# Patient Record
Sex: MALE | Race: WHITE | NOT HISPANIC OR LATINO
[De-identification: names, ages, dates, MRNs, and addresses within clinical notes are randomized per-mention and may not be internally consistent; named-entity substitution may affect disease eponyms.]

---

## 2019-06-24 ENCOUNTER — APPOINTMENT (OUTPATIENT)
Dept: ORTHOPEDIC SURGERY | Facility: CLINIC | Age: 54
End: 2019-06-24
Payer: COMMERCIAL

## 2019-06-24 VITALS
HEIGHT: 76 IN | SYSTOLIC BLOOD PRESSURE: 141 MMHG | BODY MASS INDEX: 26.79 KG/M2 | DIASTOLIC BLOOD PRESSURE: 88 MMHG | WEIGHT: 220 LBS | HEART RATE: 79 BPM

## 2019-06-24 DIAGNOSIS — M51.26 OTHER INTERVERTEBRAL DISC DISPLACEMENT, LUMBAR REGION: ICD-10-CM

## 2019-06-24 PROCEDURE — 72100 X-RAY EXAM L-S SPINE 2/3 VWS: CPT

## 2019-06-24 PROCEDURE — 99204 OFFICE O/P NEW MOD 45 MIN: CPT

## 2019-06-24 RX ORDER — TIZANIDINE 4 MG/1
4 TABLET ORAL
Qty: 60 | Refills: 1 | Status: ACTIVE | COMMUNITY
Start: 2019-06-24 | End: 1900-01-01

## 2019-06-24 NOTE — HISTORY OF PRESENT ILLNESS
[de-identified] : 53 yo M with LBP for 1 month\par in bed, rolled felt pull in right side lower back\par has had ?back issues" with left side lower back in the past, but right is new\par feels cramping and tightness in lower back\par intermittent, worse with bending down\par aleve with some relief

## 2019-06-24 NOTE — ADDENDUM
[FreeTextEntry1] : I, Zheng Aviles, acted solely as a scribe for Dr. Hernandez on 06/24/2019  .\par  \par All medical record entries made by the scribe were at my, Dr. Hernandez, direction and personally dictated by me on 06/24/2019. I have reviewed the chart and agree that the record accurately reflects my personal performance of the history, physical exam, assessment and plan. I have also personally directed, reviewed, and agreed with the chart.\par

## 2019-06-24 NOTE — DISCUSSION/SUMMARY
[de-identified] : L5-S1 degenerative disc disease.\par Discussed options\par PT and HEP.\par NSAIDs\par if no better 3 weeks-MRI lumbar\par Tizanidine\par All questions were answered, all alternatives discussed and the patient is in complete agreement with that plan. Follow-up appointment as instructed. Any issues and the patient will call or come in sooner. \par

## 2019-06-24 NOTE — PHYSICAL EXAM
[Normal] : Gait: normal [SLR] : negative straight leg raise [de-identified] : 5 out of 5 motor strength, sensation is intact and symmetrical full range of motion flexion extension and rotation, no palpatory tenderness full range of motion of hips knees shoulders and elbows (all four extremities), no atrophy, negative straight leg raise, no pathological reflexes, no swelling, normal ambulation, no apparent distress skin is intact, no palpable lymph nodes, no upper or lower extremity instability, alert and oriented x3 and normal mood. Normal finger-to nose test. pain over right SI joint [de-identified] : AP/lat lumbar-L5-S1 decreased disc height-reviewed with patient.\par

## 2019-08-02 ENCOUNTER — APPOINTMENT (OUTPATIENT)
Dept: ORTHOPEDIC SURGERY | Facility: CLINIC | Age: 54
End: 2019-08-02

## 2019-08-02 ENCOUNTER — EMERGENCY (EMERGENCY)
Facility: HOSPITAL | Age: 54
LOS: 1 days | Discharge: HOME | End: 2019-08-02
Attending: EMERGENCY MEDICINE | Admitting: EMERGENCY MEDICINE
Payer: COMMERCIAL

## 2019-08-02 VITALS
SYSTOLIC BLOOD PRESSURE: 143 MMHG | HEART RATE: 62 BPM | DIASTOLIC BLOOD PRESSURE: 95 MMHG | RESPIRATION RATE: 16 BRPM | OXYGEN SATURATION: 97 % | WEIGHT: 214.95 LBS | HEIGHT: 76 IN | TEMPERATURE: 98 F

## 2019-08-02 VITALS
TEMPERATURE: 98 F | OXYGEN SATURATION: 96 % | RESPIRATION RATE: 18 BRPM | SYSTOLIC BLOOD PRESSURE: 123 MMHG | DIASTOLIC BLOOD PRESSURE: 83 MMHG | HEART RATE: 56 BPM

## 2019-08-02 PROCEDURE — 73140 X-RAY EXAM OF FINGER(S): CPT

## 2019-08-02 PROCEDURE — 99283 EMERGENCY DEPT VISIT LOW MDM: CPT

## 2019-08-02 PROCEDURE — 73140 X-RAY EXAM OF FINGER(S): CPT | Mod: 26,RT

## 2019-08-02 PROCEDURE — 99283 EMERGENCY DEPT VISIT LOW MDM: CPT | Mod: 25

## 2019-08-02 RX ORDER — OXYCODONE AND ACETAMINOPHEN 5; 325 MG/1; MG/1
1 TABLET ORAL ONCE
Refills: 0 | Status: DISCONTINUED | OUTPATIENT
Start: 2019-08-02 | End: 2019-08-02

## 2019-08-02 RX ORDER — CEPHALEXIN 500 MG
500 CAPSULE ORAL ONCE
Refills: 0 | Status: COMPLETED | OUTPATIENT
Start: 2019-08-02 | End: 2019-08-02

## 2019-08-02 RX ADMIN — Medication 500 MILLIGRAM(S): at 02:48

## 2019-08-02 RX ADMIN — OXYCODONE AND ACETAMINOPHEN 1 TABLET(S): 5; 325 TABLET ORAL at 02:49

## 2019-08-02 RX ADMIN — OXYCODONE AND ACETAMINOPHEN 1 TABLET(S): 5; 325 TABLET ORAL at 02:09

## 2019-08-02 NOTE — ED PROVIDER NOTE - SKIN, MLM
right 4th finger, nail lifted from nail bed, bleeding actively underneath right 4th finger, nail lifted from nail bed, bleeding actively underneath, +ecchymosis/swelling distal phalynx

## 2019-08-02 NOTE — ED PROVIDER NOTE - CARE PLAN
Principal Discharge DX:	Open displaced fracture of distal phalanx of right ring finger, initial encounter

## 2019-08-02 NOTE — ED ADULT NURSE NOTE - CAS EDN DISCHARGE ASSESSMENT
Dressing clean and dry/No adverse reaction to first time med in ED/Alert and oriented to person, place and time

## 2019-08-02 NOTE — ED PROVIDER NOTE - PROGRESS NOTE DETAILS
provided splint and dressing materials, advise f/u here 2d for wound check, given # for [facility] for f/u will call in am spoke with Dr. Degroot about the case, will have the office call the patient to schedule follow up

## 2019-08-02 NOTE — ED PROVIDER NOTE - OBJECTIVE STATEMENT
54 y.o. M c/o pain right 54 y.o. M c/o pain right 4th digit - was hit straight on by softball 54 y.o. M c/o pain right 4th digit - was hit straight on by softball about 1.5 hrs ago, nail lifted, has been rebandaging, keeps bleeding, not too painful, no paresthesia, no other injury

## 2019-08-02 NOTE — ED PROCEDURE NOTE - PROCEDURE ADDITIONAL DETAILS
lido with epi sprayed onto nailbed to help with bleeding control, xeroform dressing applied to nailbed and fingertip wrapped, bulky bandage applied over mid to distal digit, effectively splinting

## 2023-11-16 ENCOUNTER — TRANSCRIPTION ENCOUNTER (OUTPATIENT)
Age: 58
End: 2023-11-16

## 2024-01-19 PROBLEM — Z78.9 OTHER SPECIFIED HEALTH STATUS: Chronic | Status: ACTIVE | Noted: 2019-08-02

## 2024-02-08 ENCOUNTER — NON-APPOINTMENT (OUTPATIENT)
Age: 59
End: 2024-02-08

## 2024-02-09 ENCOUNTER — APPOINTMENT (OUTPATIENT)
Dept: PULMONOLOGY | Facility: CLINIC | Age: 59
End: 2024-02-09
Payer: COMMERCIAL

## 2024-02-09 VITALS
OXYGEN SATURATION: 97 % | WEIGHT: 230 LBS | HEART RATE: 61 BPM | HEIGHT: 76 IN | SYSTOLIC BLOOD PRESSURE: 135 MMHG | DIASTOLIC BLOOD PRESSURE: 71 MMHG | BODY MASS INDEX: 28.01 KG/M2

## 2024-02-09 DIAGNOSIS — Z85.47 PERSONAL HISTORY OF MALIGNANT NEOPLASM OF TESTIS: ICD-10-CM

## 2024-02-09 DIAGNOSIS — Z80.42 FAMILY HISTORY OF MALIGNANT NEOPLASM OF PROSTATE: ICD-10-CM

## 2024-02-09 DIAGNOSIS — Z80.52 FAMILY HISTORY OF MALIGNANT NEOPLASM OF BLADDER: ICD-10-CM

## 2024-02-09 DIAGNOSIS — Z00.00 ENCOUNTER FOR GENERAL ADULT MEDICAL EXAMINATION W/OUT ABNORMAL FINDINGS: ICD-10-CM

## 2024-02-09 DIAGNOSIS — K21.9 GASTRO-ESOPHAGEAL REFLUX DISEASE W/OUT ESOPHAGITIS: ICD-10-CM

## 2024-02-09 DIAGNOSIS — T75.89XA OTHER SPECIFIED EFFECTS OF EXTERNAL CAUSES, INITIAL ENCOUNTER: ICD-10-CM

## 2024-02-09 DIAGNOSIS — G47.00 INSOMNIA, UNSPECIFIED: ICD-10-CM

## 2024-02-09 DIAGNOSIS — Z01.812 ENCOUNTER FOR PREPROCEDURAL LABORATORY EXAMINATION: ICD-10-CM

## 2024-02-09 DIAGNOSIS — F41.9 ANXIETY DISORDER, UNSPECIFIED: ICD-10-CM

## 2024-02-09 LAB
BILIRUB UR QL STRIP: NORMAL
CLARITY UR: CLEAR
COLLECTION METHOD: NORMAL
GLUCOSE UR-MCNC: NORMAL
HCG UR QL: 0.2 EU/DL
HGB UR QL STRIP.AUTO: NORMAL
KETONES UR-MCNC: NORMAL
LEUKOCYTE ESTERASE UR QL STRIP: NORMAL
NITRITE UR QL STRIP: NORMAL
PH UR STRIP: 6
POCT - HEMOGLOBIN (HGB), QUANTITATIVE, TRANSCUTANEOUS: 15.4
PROT UR STRIP-MCNC: NORMAL
SP GR UR STRIP: 1.01

## 2024-02-09 PROCEDURE — ZZZZZ: CPT

## 2024-02-09 PROCEDURE — 81003 URINALYSIS AUTO W/O SCOPE: CPT | Mod: QW

## 2024-02-09 PROCEDURE — 94729 DIFFUSING CAPACITY: CPT

## 2024-02-09 PROCEDURE — 94060 EVALUATION OF WHEEZING: CPT

## 2024-02-09 PROCEDURE — 94727 GAS DIL/WSHOT DETER LNG VOL: CPT

## 2024-02-09 PROCEDURE — 36415 COLL VENOUS BLD VENIPUNCTURE: CPT

## 2024-02-09 PROCEDURE — 93000 ELECTROCARDIOGRAM COMPLETE: CPT

## 2024-02-09 PROCEDURE — 71046 X-RAY EXAM CHEST 2 VIEWS: CPT

## 2024-02-09 PROCEDURE — 99205 OFFICE O/P NEW HI 60 MIN: CPT | Mod: 25

## 2024-02-09 PROCEDURE — 88738 HGB QUANT TRANSCUTANEOUS: CPT

## 2024-02-09 RX ORDER — OMEPRAZOLE 40 MG/1
40 CAPSULE, DELAYED RELEASE ORAL
Qty: 30 | Refills: 3 | Status: ACTIVE | COMMUNITY
Start: 2024-02-09

## 2024-02-09 RX ORDER — ZOLPIDEM TARTRATE 10 MG/1
10 TABLET, FILM COATED ORAL
Refills: 0 | Status: ACTIVE | COMMUNITY
Start: 2024-02-09

## 2024-02-09 RX ORDER — ESCITALOPRAM OXALATE 10 MG/1
10 TABLET, FILM COATED ORAL
Refills: 0 | Status: ACTIVE | COMMUNITY
Start: 2024-02-09

## 2024-02-09 NOTE — REASON FOR VISIT
[Initial] : an initial visit [TextBox_44] : [employer] certification, exposure, survivorship program

## 2024-02-09 NOTE — REVIEW OF SYSTEMS
[Fatigue] : fatigue [Seasonal Allergies] : seasonal allergies [GERD] : gerd [Anxiety] : anxiety [Negative] : Neurologic [Diabetes] : no diabetes [Thyroid Problem] : no thyroid problem [TextBox_91] : Lumbar disc disease [TextBox_148] : hx testicular  cancer 2013

## 2024-02-09 NOTE — HISTORY OF PRESENT ILLNESS
[Never] : never [TextBox_4] : [employer] 911 certification [employer] exposure Certification diagnosis testicular cancer 2013 GERD Anxiety  Medications  Prilosec Ambien Lexapro  Family history reported Mother 81 Alzheimer's Father 81 prostate and bladder cancer  Systems reviewed Low respiratory symptoms did not report cough wheeze shortness of breath chest tightness No reported history of asthma COPD bronchitis pneumonia Upper respiratory symptoms No reported sinus disease Cardiovascular reported negative for chest burning chest pressure chest pain  Patient Gastrointestinal positive GERD symptoms Reported a positive history of foot disorder Social history Non-smoker Social alcohol

## 2024-02-09 NOTE — PROCEDURE
[FreeTextEntry1] : Blood draw  Chest x-ray PA lateral February 9, 2024 Cardiac size is normal Lung fields are clear Infiltrate pleural effusion or dominant pulmonary nodules not identified Soft tissue bony structures unremarkable  EKG 2/9/24 NSR NSST changes old  ant wall changhes reports  neg stress test  PFT 2/9/24 [place] nl flow  rates  Lung Volumes nl  No  airtrapping  DLCO 106 % HGB 15.4

## 2024-02-09 NOTE — DISCUSSION/SUMMARY
[FreeTextEntry1] : [employer] 911 certification [email or web address] exposure Certification diagnosis testicular cancer 2013 currently reports no active disease with routine monitoring GERD Anxiety  Recommendation per Kiddie Kist Blood work is pending CBC comprehensive metabolic profile lipid profile Recommended to maintain up-to-date colorectal screen Maintain up-to-date prostate cancer screening Maintain up-to-date vaccine protocol including but not limited to flu COVID pneumonia RSV with primary care physician There was some abnormal findings on the EKG patient states that recent stress test that was normal We will provide him with EKG to present to his primary care physician and cardiologist for complete review Patient is in agreement with plan

## 2024-02-10 ENCOUNTER — NON-APPOINTMENT (OUTPATIENT)
Age: 59
End: 2024-02-10

## 2024-02-10 LAB
ALBUMIN SERPL ELPH-MCNC: 5 G/DL
ALP BLD-CCNC: 39 U/L
ALT SERPL-CCNC: 20 U/L
ANION GAP SERPL CALC-SCNC: 12 MMOL/L
AST SERPL-CCNC: 19 U/L
BASOPHILS # BLD AUTO: 0.03 K/UL
BASOPHILS NFR BLD AUTO: 0.5 %
BILIRUB SERPL-MCNC: 0.5 MG/DL
BUN SERPL-MCNC: 16 MG/DL
CALCIUM SERPL-MCNC: 9.8 MG/DL
CHLORIDE SERPL-SCNC: 101 MMOL/L
CHOLEST SERPL-MCNC: 198 MG/DL
CO2 SERPL-SCNC: 25 MMOL/L
CREAT SERPL-MCNC: 1.12 MG/DL
EGFR: 77 ML/MIN/1.73M2
EOSINOPHIL # BLD AUTO: 0.07 K/UL
EOSINOPHIL NFR BLD AUTO: 1.2 %
GLUCOSE SERPL-MCNC: 94 MG/DL
HCT VFR BLD CALC: 47.7 %
HDLC SERPL-MCNC: 37 MG/DL
HGB BLD-MCNC: 15.2 G/DL
IMM GRANULOCYTES NFR BLD AUTO: 0.5 %
LDLC SERPL CALC-MCNC: 137 MG/DL
LYMPHOCYTES # BLD AUTO: 1.28 K/UL
LYMPHOCYTES NFR BLD AUTO: 21.7 %
MAN DIFF?: NORMAL
MCHC RBC-ENTMCNC: 26.9 PG
MCHC RBC-ENTMCNC: 31.9 GM/DL
MCV RBC AUTO: 84.4 FL
MONOCYTES # BLD AUTO: 0.39 K/UL
MONOCYTES NFR BLD AUTO: 6.6 %
NEUTROPHILS # BLD AUTO: 4.1 K/UL
NEUTROPHILS NFR BLD AUTO: 69.5 %
NONHDLC SERPL-MCNC: 162 MG/DL
PLATELET # BLD AUTO: 215 K/UL
POTASSIUM SERPL-SCNC: 4.3 MMOL/L
PROT SERPL-MCNC: 7.3 G/DL
RBC # BLD: 5.65 M/UL
RBC # FLD: 13.9 %
SODIUM SERPL-SCNC: 138 MMOL/L
TRIGL SERPL-MCNC: 137 MG/DL
WBC # FLD AUTO: 5.9 K/UL
